# Patient Record
(demographics unavailable — no encounter records)

---

## 2024-10-10 NOTE — PHYSICAL EXAM
[General Appearance - Alert] : alert [Neck Appearance] : the appearance of the neck was normal [Auscultation Breath Sounds / Voice Sounds] : lungs were clear to auscultation bilaterally [Heart Rate And Rhythm] : heart rate was normal and rhythm regular [Heart Sounds] : normal S1 and S2 [FreeTextEntry1] : diffuse tender points over uppear and lower body [] : no rash [No Focal Deficits] : no focal deficits [Oriented To Time, Place, And Person] : oriented to person, place, and time

## 2024-10-10 NOTE — HISTORY OF PRESENT ILLNESS
[FreeTextEntry1] : 62yo F in the office for evaluation, PMHX newly diagnosed diabetes, HLD  History: patient with progressive fatigue, aches, not feeling herself PCP evaluation extensive workup positive SABRINA and dsnda newly diagnosed diabetes started treatment  ROS no prior history of SLE no sle skin manifestations no raynauds no oral ulcers no hx of uveitis no synovitis no dactylitis  quest labs reviewed sabrina+, low titer dsdna  Psoriasi skin disease diagnosed 5 years ago managed with topical treatment